# Patient Record
(demographics unavailable — no encounter records)

---

## 2024-10-07 NOTE — ASSESSMENT
[FreeTextEntry1] : Check lipids on Rosuvastatin.  Discussed diet and exercise.  Check a HgBA1c which was 5.7.   He had a calcium CT of 35 three years ago and we agreed he will go for another.  Could consider a routine cardiology evaluation.  He had an aortic root of 4.0 cm last year.  Will follow-up later.  He sees a dermatologist and ophthalmologist.  Check a PSA.  Colonoscopy and EGD fine 5/24.  He will have the flu vaccine and new COVID-19 vaccine later.  S/p Shingrix.

## 2024-10-07 NOTE — HEALTH RISK ASSESSMENT
[No falls in past year] : Patient reported no falls in the past year [0] : 2) Feeling down, depressed, or hopeless: Not at all (0) [Fully functional (bathing, dressing, toileting, transferring, walking, feeding)] : Fully functional (bathing, dressing, toileting, transferring, walking, feeding) [Fully functional (using the telephone, shopping, preparing meals, housekeeping, doing laundry, using] : Fully functional and needs no help or supervision to perform IADLs (using the telephone, shopping, preparing meals, housekeeping, doing laundry, using transportation, managing medications and managing finances) [[id number]] : 0 [Reports changes in hearing] : Reports no changes in hearing [Reports changes in vision] : Reports no changes in vision [Reports changes in dental health] : Reports no changes in dental health

## 2024-10-07 NOTE — HISTORY OF PRESENT ILLNESS
[FreeTextEntry1] : The patient is here for a routine visit.  [de-identified] : He feels well.  He exercises regularly and just did a half iron man.  No chest pain or dyspnea.  His diet is fairly good.  He has similar mild urinary frequency.